# Patient Record
Sex: FEMALE | Race: WHITE | Employment: STUDENT | URBAN - NONMETROPOLITAN AREA
[De-identification: names, ages, dates, MRNs, and addresses within clinical notes are randomized per-mention and may not be internally consistent; named-entity substitution may affect disease eponyms.]

---
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
## 2024-01-25 ENCOUNTER — OFFICE VISIT (OUTPATIENT)
Dept: URGENT CARE | Facility: MEDICAL CENTER | Age: 19
End: 2024-01-25
Payer: COMMERCIAL

## 2024-01-25 VITALS
TEMPERATURE: 98.2 F | SYSTOLIC BLOOD PRESSURE: 98 MMHG | RESPIRATION RATE: 20 BRPM | HEART RATE: 89 BPM | WEIGHT: 143 LBS | DIASTOLIC BLOOD PRESSURE: 42 MMHG | OXYGEN SATURATION: 98 %

## 2024-01-25 DIAGNOSIS — J02.9 SORE THROAT: Primary | ICD-10-CM

## 2024-01-25 LAB — S PYO AG THROAT QL: NEGATIVE

## 2024-01-25 PROCEDURE — 87070 CULTURE OTHR SPECIMN AEROBIC: CPT

## 2024-01-25 PROCEDURE — 99212 OFFICE O/P EST SF 10 MIN: CPT

## 2024-01-25 PROCEDURE — 87880 STREP A ASSAY W/OPTIC: CPT

## 2024-01-25 NOTE — PROGRESS NOTES
[facility]        NAME: Brisa Castillo is a 18 y.o. female  : 2005    MRN: [id number]  DATE: 2024  TIME: 2:23 PM    Assessment and Plan   Sore throat [J02.9]  1. Sore throat  POCT rapid ANTIGEN strepA    Throat culture            Patient Instructions       Follow up with PCP in 3-5 days.  Proceed to  ER if symptoms worsen.    Chief Complaint     Chief Complaint   Patient presents with   • Sore Throat     Sore throat started about a week ago.    • Cough   • Nasal Congestion         History of Present Illness       Patient here with sore throat which started about 1 week ago. She has not had any fevers. Patient is about 19wks pregnant- no complications this far. Receiving normal prenatal care. Not taking anything at home for her symptoms other than cough drops.         Review of Systems   Review of Systems   HENT:  Positive for congestion, rhinorrhea and sore throat.    Respiratory:  Positive for cough.          Current Medications       Current Outpatient Medications:   •  Ferrous Sulfate (IRON PO), Take 1 tablet by mouth daily, Disp: , Rfl:     Current Allergies     Allergies as of 2024 - Reviewed 2024   Allergen Reaction Noted   • Amoxicillin Rash 2024   • Penicillins Rash 2024            The following portions of the patient's history were reviewed and updated as appropriate: allergies, current medications, past family history, past medical history, past social history, past surgical history and problem list.     History reviewed. No pertinent past medical history.    History reviewed. No pertinent surgical history.    History reviewed. No pertinent family history.      Medications have been verified.        Objective   BP (!) 98/42   Pulse 89   Temp 98.2 °F (36.8 °C)   Resp 20   Wt 64.9 kg (143 lb)   SpO2 98%        Physical Exam     Physical Exam  Vitals and nursing note reviewed.   Constitutional:       General: She is not in acute distress.      Appearance: Normal appearance. She is normal weight. She is not ill-appearing.   HENT:      Head: Normocephalic and atraumatic.      Right Ear: Tympanic membrane, ear canal and external ear normal.      Left Ear: Tympanic membrane, ear canal and external ear normal.      Nose: Nose normal.      Mouth/Throat:      Mouth: Mucous membranes are moist.      Pharynx: Oropharynx is clear.   Eyes:      Extraocular Movements: Extraocular movements intact.      Conjunctiva/sclera: Conjunctivae normal.      Pupils: Pupils are equal, round, and reactive to light.   Cardiovascular:      Rate and Rhythm: Normal rate and regular rhythm.      Pulses: Normal pulses.      Heart sounds: Normal heart sounds.   Pulmonary:      Effort: Pulmonary effort is normal.      Breath sounds: Normal breath sounds.   Abdominal:      General: Abdomen is flat. Bowel sounds are normal.      Palpations: Abdomen is soft.   Musculoskeletal:         General: Normal range of motion.      Cervical back: Normal range of motion and neck supple.   Skin:     General: Skin is warm.      Capillary Refill: Capillary refill takes less than 2 seconds.   Neurological:      General: No focal deficit present.      Mental Status: She is alert and oriented to person, place, and time.   Psychiatric:         Mood and Affect: Mood normal.         Behavior: Behavior normal.

## 2024-01-25 NOTE — PATIENT INSTRUCTIONS
You may take over the counter Tylenol (Acetaminophen) as needed, as directed on packaging.   Be sure to get plenty of rest, and drinking fluids to remain hydrated.   Please follow up with your primary provider in the next several days. Should you have any worsening of symptoms, or lack of improvement please be re-evaluated. If needed for significant concerns, consider 911 or ER evaluation.

## 2024-01-27 LAB — BACTERIA THROAT CULT: NORMAL

## 2024-03-20 ENCOUNTER — OFFICE VISIT (OUTPATIENT)
Dept: URGENT CARE | Facility: MEDICAL CENTER | Age: 19
End: 2024-03-20

## 2024-03-20 VITALS
OXYGEN SATURATION: 98 % | WEIGHT: 159 LBS | HEART RATE: 90 BPM | DIASTOLIC BLOOD PRESSURE: 52 MMHG | HEIGHT: 65 IN | TEMPERATURE: 98.6 F | SYSTOLIC BLOOD PRESSURE: 121 MMHG | RESPIRATION RATE: 18 BRPM | BODY MASS INDEX: 26.49 KG/M2

## 2024-03-20 DIAGNOSIS — B34.9 VIRAL ILLNESS: Primary | ICD-10-CM

## 2024-03-20 PROCEDURE — 99212 OFFICE O/P EST SF 10 MIN: CPT | Performed by: PHYSICIAN ASSISTANT

## 2024-03-20 NOTE — PROGRESS NOTES
[facility]        NAME: Brisa Castillo is a 18 y.o. female  : 2005    MRN: [id number]  DATE: 2024  TIME: 12:44 PM    Assessment and Plan   Viral illness [B34.9]  1. Viral illness              Patient Instructions     Tylenol or Ibuprofen as needed for fever or pain  Drink plenty of fluids  Over the Counter cold medication to control symptoms, check with OB regarding specifics  If symptoms fail to improve follow up with PCP  If symptoms worsen have yourself rechecked  Follow up with PCP in 3-5 days.  Proceed to  ER if symptoms worsen.    If tests have been performed at [facility], our office will contact you with results if changes need to be made to the care plan discussed with you at the visit.  [facility]    Chief Complaint     Chief Complaint   Patient presents with   • Cold Like Symptoms     Sore throat and cough x 3 days    • Abdominal Pain     Abdominal pain started yesterday           History of Present Illness       Patient presents with a 3-day history of diminished appetite, runny, stuffy nose, sore throat, cough, body aches and headaches.  The runny, stuffy nose and headache are resolved.  Yesterday, she had upper abdominal pain which lasted for approximately a minute.  She describes the abdominal pain as achy.  It is not associated with any nausea, vomiting or diarrhea.  Pain has not reoccurred today.  Patient denies vaginal discharge or vaginal bleeding.  She is due for her next OB in 2 days.  Patient denies urinary symptoms.  She states symptoms have improved throughout today. Pateint has felt fetal movement today.        Review of Systems   Review of Systems   Constitutional:  Positive for appetite change. Negative for fever.   HENT:  Positive for congestion (resolved), rhinorrhea (resolved) and sore throat.    Respiratory:  Positive for cough.    Gastrointestinal:  Positive for abdominal pain (1 minute, achy) and nausea. Negative  "for diarrhea and vomiting.   Genitourinary:  Negative for difficulty urinating, dysuria, frequency, urgency, vaginal bleeding and vaginal discharge.   Musculoskeletal:  Positive for myalgias.   Skin:  Negative for rash.   Neurological:  Positive for headaches (yesterday).         Current Medications       Current Outpatient Medications:   •  Ferrous Sulfate (IRON PO), Take 1 tablet by mouth daily, Disp: , Rfl:     Current Allergies     Allergies as of 03/20/2024 - Reviewed 03/20/2024   Allergen Reaction Noted   • Amoxicillin Rash 01/25/2024   • Penicillins Rash 01/25/2024            The following portions of the patient's history were reviewed and updated as appropriate: allergies, current medications, past family history, past medical history, past social history, past surgical history and problem list.     No past medical history on file.    No past surgical history on file.    No family history on file.      Medications have been verified.        Objective   /52   Pulse 90   Temp 98.6 °F (37 °C)   Resp 18   Ht 5' 5\" (1.651 m)   Wt 72.1 kg (159 lb)   SpO2 98%   BMI 26.46 kg/m²   No LMP recorded. Patient is pregnant.       Physical Exam     Physical Exam  Vitals and nursing note reviewed.   Constitutional:       Appearance: She is well-developed.   HENT:      Head: Normocephalic and atraumatic.      Mouth/Throat:      Mouth: Mucous membranes are moist.      Pharynx: Oropharynx is clear.   Cardiovascular:      Rate and Rhythm: Normal rate and regular rhythm.      Heart sounds: Normal heart sounds.   Pulmonary:      Effort: Pulmonary effort is normal.   Abdominal:      Comments: Gravid uterus, abdomen non tender   Neurological:      Mental Status: She is alert.                   "